# Patient Record
(demographics unavailable — no encounter records)

---

## 2025-05-05 NOTE — HISTORY OF PRESENT ILLNESS
[FreeTextEntry1] : 42 y/o F presents for initial evaluation.   Reason for visit: colonoscopy consultation.   Referred by: former patient.   PMHx: anal fistula  PSHx: , breast reduction, breast cyst removal   Medications: levotocitrizine Allergies: NKDA, pollen   Family hx: paternal grandmother (+) colon cancer Smoker: denies  Pregnancies:  (4 vaginal deliveries, 1 )   Colonoscopy: Reports performed with Dr. Najera with colon polyps removed ~2017  [facility].  States had colonoscopy performed by Dr. Najera about 8 years ago to evaluate anal fistula.  Denies any rectal pain, rectal bleeding, will intermittently have yellowish drainage that will randomly occur.   Bowel movement is normal. Usually daily. Stool and formed. Denies constipation.  Denies taking any stool softener/fiber supplement.